# Patient Record
Sex: FEMALE
[De-identification: names, ages, dates, MRNs, and addresses within clinical notes are randomized per-mention and may not be internally consistent; named-entity substitution may affect disease eponyms.]

---

## 2021-11-21 ENCOUNTER — APPOINTMENT (OUTPATIENT)
Dept: AFTER HOURS CARE | Facility: EMERGENCY ROOM | Age: 31
End: 2021-11-21
Payer: MEDICAID

## 2021-11-21 DIAGNOSIS — R39.9 UNSPECIFIED SYMPTOMS AND SIGNS INVOLVING THE GENITOURINARY SYSTEM: ICD-10-CM

## 2021-11-21 PROCEDURE — 99202 OFFICE O/P NEW SF 15 MIN: CPT | Mod: 95

## 2021-11-22 PROBLEM — Z00.00 ENCOUNTER FOR PREVENTIVE HEALTH EXAMINATION: Status: ACTIVE | Noted: 2021-11-22

## 2021-11-22 PROBLEM — R39.9 UTI SYMPTOMS: Status: ACTIVE | Noted: 2021-11-22

## 2021-11-22 NOTE — HISTORY OF PRESENT ILLNESS
[Home] : at home, [unfilled] , at the time of the visit. [Other Location: e.g. Home (Enter Location, City,State)___] : at [unfilled] [Verbal consent obtained from patient] : the patient, [unfilled] [FreeTextEntry8] : 20 yo f no pmh NKDA c/o UTI symtpoms since yesterday. Pt reports sudden onset of symptoms similar to prior and include urinary frequency, dysuria. She denies hematuria, fevers, no back pain, no n/v/d.

## 2021-11-22 NOTE — PLAN
[With new medications prescribed] : Treat in place: with new medications prescribed [Primary Care/Internal Medicine/PMD] : Primary Care/Internal Medicine/PMD [FreeTextEntry1] : 1)	Rx for keflex will be sent to [facility] on [address] \par 2)	Advised to follow up w/ PMD\par

## 2021-11-22 NOTE — ASSESSMENT
[FreeTextEntry1] : Pt w/ aforementioned presentation concerning for UTI. Pt has no symptoms concerning for pyelonephritis or nephrolithiasis\par

## 2021-11-22 NOTE — PHYSICAL EXAM
[de-identified] : General: pt appears stated age, and is not in distress, speaking in full clear sentences\par HEENT: AT/NC, pink conjunctiva, anicteric sclerae, EOMI, mmm\par Neck: supple, full ROM, trachea midline\par Lungs: symmetric excursion, no respiratory distress\par Extremities: no clubbing, cyanosis, or edema visualized and no obvious deformities or fractures\par Skin: no rashes, petechiae, ecchymoses, or jaundice\par Neuro: awake, alert, responsive; oriented to person, place and time; cranial nerves grossly intact, EOMI intact jaw movement, no facial asymmetry, hearing intact\par